# Patient Record
Sex: MALE | Race: WHITE | NOT HISPANIC OR LATINO | Employment: FULL TIME | URBAN - METROPOLITAN AREA
[De-identification: names, ages, dates, MRNs, and addresses within clinical notes are randomized per-mention and may not be internally consistent; named-entity substitution may affect disease eponyms.]

---

## 2018-09-05 PROBLEM — M54.17 LUMBOSACRAL RADICULOPATHY: Status: ACTIVE | Noted: 2018-09-05

## 2018-10-17 PROBLEM — M54.17 RADICULITIS, LUMBOSACRAL: Status: ACTIVE | Noted: 2018-10-17

## 2018-11-21 PROBLEM — M47.816 LUMBAR SPONDYLOSIS: Status: ACTIVE | Noted: 2018-11-21

## 2020-12-10 ENCOUNTER — LAB VISIT (OUTPATIENT)
Dept: PRIMARY CARE CLINIC | Facility: OTHER | Age: 65
End: 2020-12-10
Attending: INTERNAL MEDICINE
Payer: OTHER GOVERNMENT

## 2020-12-10 DIAGNOSIS — Z03.818 ENCOUNTER FOR OBSERVATION FOR SUSPECTED EXPOSURE TO OTHER BIOLOGICAL AGENTS RULED OUT: ICD-10-CM

## 2020-12-10 PROCEDURE — U0003 INFECTIOUS AGENT DETECTION BY NUCLEIC ACID (DNA OR RNA); SEVERE ACUTE RESPIRATORY SYNDROME CORONAVIRUS 2 (SARS-COV-2) (CORONAVIRUS DISEASE [COVID-19]), AMPLIFIED PROBE TECHNIQUE, MAKING USE OF HIGH THROUGHPUT TECHNOLOGIES AS DESCRIBED BY CMS-2020-01-R: HCPCS

## 2020-12-11 LAB — SARS-COV-2 RNA RESP QL NAA+PROBE: NOT DETECTED

## 2021-04-29 ENCOUNTER — PATIENT MESSAGE (OUTPATIENT)
Dept: RESEARCH | Facility: HOSPITAL | Age: 66
End: 2021-04-29

## 2021-07-01 ENCOUNTER — PATIENT MESSAGE (OUTPATIENT)
Dept: ADMINISTRATIVE | Facility: OTHER | Age: 66
End: 2021-07-01

## 2022-04-21 ENCOUNTER — PATIENT OUTREACH (OUTPATIENT)
Dept: ADMINISTRATIVE | Facility: HOSPITAL | Age: 67
End: 2022-04-21

## 2022-05-17 ENCOUNTER — PATIENT OUTREACH (OUTPATIENT)
Dept: ADMINISTRATIVE | Facility: HOSPITAL | Age: 67
End: 2022-05-17

## 2022-05-20 ENCOUNTER — PATIENT OUTREACH (OUTPATIENT)
Dept: ADMINISTRATIVE | Facility: HOSPITAL | Age: 67
End: 2022-05-20

## 2022-05-20 DIAGNOSIS — Z12.11 SCREENING FOR COLON CANCER: Primary | ICD-10-CM

## 2023-01-13 ENCOUNTER — PATIENT OUTREACH (OUTPATIENT)
Dept: ADMINISTRATIVE | Facility: HOSPITAL | Age: 68
End: 2023-01-13

## 2023-01-13 ENCOUNTER — PATIENT MESSAGE (OUTPATIENT)
Dept: ADMINISTRATIVE | Facility: HOSPITAL | Age: 68
End: 2023-01-13

## 2023-01-13 NOTE — PROGRESS NOTES
[facility] colon cancer screen gap report review. No colon cancer screen found.   Patient outreach regarding CRS.

## 2023-01-20 ENCOUNTER — PATIENT OUTREACH (OUTPATIENT)
Dept: ADMINISTRATIVE | Facility: HOSPITAL | Age: 68
End: 2023-01-20

## 2023-01-20 NOTE — PROGRESS NOTES
"Attempted to outreach patient regarding overdue/ due [facility] colon cancer screening via "[employer]". No response after a week. Now sending outreach via mail out letter.    "
no

## 2023-01-20 NOTE — LETTER
January 20, 2023    Edwin Schaeffer  [address] [address]             [facility]  [address]  [facility] [address]  Phone: [phone] Hi,  I hope you are well. According to our records you are due for a colon cancer screen. If you have had this done outside of [facility], please let us know as we will request a copy of the report to update your [facility] record. If you've not yet had a colon cancer screen, please give us a call at [phone]. We are happy to help you get this taken care of.     Our records show that an at home colon cancer screening kit called a  Fit Kit was dispensed to you in the past. If this is still the test you prefer, please collect your stool specimen and return the testing kit per the instructions. If you need a new Fit Kit dispensed please let me know and I will place one in the mail for you.     Thanks and have a good day!      Adryan Almendarez LPN Clinical Care Coordinator  [facility] [address] [place] [address]  [phone] (phone)  [phone] (fax)